# Patient Record
Sex: FEMALE | Race: WHITE | NOT HISPANIC OR LATINO | Employment: FULL TIME | URBAN - METROPOLITAN AREA
[De-identification: names, ages, dates, MRNs, and addresses within clinical notes are randomized per-mention and may not be internally consistent; named-entity substitution may affect disease eponyms.]

---

## 2021-01-19 ENCOUNTER — APPOINTMENT (OUTPATIENT)
Dept: GENERAL RADIOLOGY | Facility: CLINIC | Age: 31
End: 2021-01-19
Attending: EMERGENCY MEDICINE
Payer: COMMERCIAL

## 2021-01-19 ENCOUNTER — HOSPITAL ENCOUNTER (EMERGENCY)
Facility: CLINIC | Age: 31
Discharge: HOME | End: 2021-01-20
Attending: EMERGENCY MEDICINE | Admitting: EMERGENCY MEDICINE
Payer: COMMERCIAL

## 2021-01-19 DIAGNOSIS — R50.9 FEVER IN ADULT: ICD-10-CM

## 2021-01-19 DIAGNOSIS — R07.9 CHEST PAIN, UNSPECIFIED TYPE: ICD-10-CM

## 2021-01-19 DIAGNOSIS — U07.1 INFECTION DUE TO 2019 NOVEL CORONAVIRUS: ICD-10-CM

## 2021-01-19 LAB
ALBUMIN SERPL-MCNC: 3.4 G/DL (ref 3.4–5)
ALP SERPL-CCNC: 82 U/L (ref 40–150)
ALT SERPL W P-5'-P-CCNC: 39 U/L (ref 0–50)
ANION GAP SERPL CALCULATED.3IONS-SCNC: 4 MMOL/L (ref 3–14)
AST SERPL W P-5'-P-CCNC: 11 U/L (ref 0–45)
B-HCG FREE SERPL-ACNC: <5 IU/L
BASOPHILS # BLD AUTO: 0.1 10E9/L (ref 0–0.2)
BASOPHILS NFR BLD AUTO: 0.4 %
BILIRUB SERPL-MCNC: 0.4 MG/DL (ref 0.2–1.3)
BUN SERPL-MCNC: 16 MG/DL (ref 7–30)
CALCIUM SERPL-MCNC: 8.7 MG/DL (ref 8.5–10.1)
CHLORIDE SERPL-SCNC: 108 MMOL/L (ref 94–109)
CO2 SERPL-SCNC: 24 MMOL/L (ref 20–32)
CREAT SERPL-MCNC: 0.68 MG/DL (ref 0.52–1.04)
D DIMER PPP FEU-MCNC: 0.4 UG/ML FEU (ref 0–0.5)
DIFFERENTIAL METHOD BLD: ABNORMAL
EOSINOPHIL # BLD AUTO: 0.3 10E9/L (ref 0–0.7)
EOSINOPHIL NFR BLD AUTO: 2.8 %
ERYTHROCYTE [DISTWIDTH] IN BLOOD BY AUTOMATED COUNT: 12.2 % (ref 10–15)
GFR SERPL CREATININE-BSD FRML MDRD: >90 ML/MIN/{1.73_M2}
GLUCOSE SERPL-MCNC: 104 MG/DL (ref 70–99)
HCT VFR BLD AUTO: 34.4 % (ref 35–47)
HGB BLD-MCNC: 11.4 G/DL (ref 11.7–15.7)
IMM GRANULOCYTES # BLD: 0.1 10E9/L (ref 0–0.4)
IMM GRANULOCYTES NFR BLD: 0.8 %
LIPASE SERPL-CCNC: 187 U/L (ref 73–393)
LYMPHOCYTES # BLD AUTO: 1.6 10E9/L (ref 0.8–5.3)
LYMPHOCYTES NFR BLD AUTO: 13.3 %
MCH RBC QN AUTO: 30.6 PG (ref 26.5–33)
MCHC RBC AUTO-ENTMCNC: 33.1 G/DL (ref 31.5–36.5)
MCV RBC AUTO: 92 FL (ref 78–100)
MONOCYTES # BLD AUTO: 0.6 10E9/L (ref 0–1.3)
MONOCYTES NFR BLD AUTO: 5.1 %
NEUTROPHILS # BLD AUTO: 9.3 10E9/L (ref 1.6–8.3)
NEUTROPHILS NFR BLD AUTO: 77.6 %
NRBC # BLD AUTO: 0 10*3/UL
NRBC BLD AUTO-RTO: 0 /100
PLATELET # BLD AUTO: 342 10E9/L (ref 150–450)
POTASSIUM SERPL-SCNC: 4 MMOL/L (ref 3.4–5.3)
PROT SERPL-MCNC: 7.5 G/DL (ref 6.8–8.8)
RBC # BLD AUTO: 3.73 10E12/L (ref 3.8–5.2)
SODIUM SERPL-SCNC: 136 MMOL/L (ref 133–144)
TROPONIN I SERPL-MCNC: <0.015 UG/L (ref 0–0.04)
WBC # BLD AUTO: 12 10E9/L (ref 4–11)

## 2021-01-19 PROCEDURE — 96374 THER/PROPH/DIAG INJ IV PUSH: CPT

## 2021-01-19 PROCEDURE — 84484 ASSAY OF TROPONIN QUANT: CPT | Performed by: EMERGENCY MEDICINE

## 2021-01-19 PROCEDURE — 87636 SARSCOV2 & INF A&B AMP PRB: CPT | Performed by: EMERGENCY MEDICINE

## 2021-01-19 PROCEDURE — 71045 X-RAY EXAM CHEST 1 VIEW: CPT

## 2021-01-19 PROCEDURE — 85025 COMPLETE CBC W/AUTO DIFF WBC: CPT | Performed by: EMERGENCY MEDICINE

## 2021-01-19 PROCEDURE — 84702 CHORIONIC GONADOTROPIN TEST: CPT

## 2021-01-19 PROCEDURE — 80053 COMPREHEN METABOLIC PANEL: CPT | Performed by: EMERGENCY MEDICINE

## 2021-01-19 PROCEDURE — C9803 HOPD COVID-19 SPEC COLLECT: HCPCS

## 2021-01-19 PROCEDURE — 258N000003 HC RX IP 258 OP 636: Performed by: EMERGENCY MEDICINE

## 2021-01-19 PROCEDURE — 93005 ELECTROCARDIOGRAM TRACING: CPT

## 2021-01-19 PROCEDURE — 83690 ASSAY OF LIPASE: CPT | Performed by: EMERGENCY MEDICINE

## 2021-01-19 PROCEDURE — 250N000013 HC RX MED GY IP 250 OP 250 PS 637: Performed by: EMERGENCY MEDICINE

## 2021-01-19 PROCEDURE — 96361 HYDRATE IV INFUSION ADD-ON: CPT

## 2021-01-19 PROCEDURE — 85379 FIBRIN DEGRADATION QUANT: CPT | Performed by: EMERGENCY MEDICINE

## 2021-01-19 PROCEDURE — 250N000009 HC RX 250: Performed by: EMERGENCY MEDICINE

## 2021-01-19 PROCEDURE — 99285 EMERGENCY DEPT VISIT HI MDM: CPT | Mod: 25

## 2021-01-19 RX ORDER — LIDOCAINE 40 MG/G
CREAM TOPICAL
Status: DISCONTINUED | OUTPATIENT
Start: 2021-01-19 | End: 2021-01-20 | Stop reason: HOSPADM

## 2021-01-19 RX ORDER — IBUPROFEN 200 MG
600 TABLET ORAL EVERY 8 HOURS PRN
Qty: 1 TABLET | Refills: 0 | Status: SHIPPED | OUTPATIENT
Start: 2021-01-19

## 2021-01-19 RX ORDER — IBUPROFEN 600 MG/1
600 TABLET, FILM COATED ORAL ONCE
Status: COMPLETED | OUTPATIENT
Start: 2021-01-19 | End: 2021-01-19

## 2021-01-19 RX ORDER — SODIUM CHLORIDE 9 MG/ML
INJECTION, SOLUTION INTRAVENOUS CONTINUOUS
Status: DISCONTINUED | OUTPATIENT
Start: 2021-01-19 | End: 2021-01-20 | Stop reason: HOSPADM

## 2021-01-19 RX ADMIN — IBUPROFEN 600 MG: 600 TABLET, FILM COATED ORAL at 23:24

## 2021-01-19 RX ADMIN — SODIUM CHLORIDE 500 ML: 9 INJECTION, SOLUTION INTRAVENOUS at 21:07

## 2021-01-19 RX ADMIN — FAMOTIDINE 20 MG: 10 INJECTION, SOLUTION INTRAVENOUS at 21:13

## 2021-01-19 ASSESSMENT — MIFFLIN-ST. JEOR: SCORE: 1173.88

## 2021-01-19 ASSESSMENT — ENCOUNTER SYMPTOMS
VOMITING: 1
FEVER: 1
COUGH: 1

## 2021-01-20 ENCOUNTER — TELEPHONE (OUTPATIENT)
Dept: EMERGENCY MEDICINE | Facility: CLINIC | Age: 31
End: 2021-01-20

## 2021-01-20 VITALS
HEART RATE: 105 BPM | RESPIRATION RATE: 27 BRPM | SYSTOLIC BLOOD PRESSURE: 129 MMHG | WEIGHT: 108 LBS | OXYGEN SATURATION: 100 % | BODY MASS INDEX: 19.14 KG/M2 | HEIGHT: 63 IN | DIASTOLIC BLOOD PRESSURE: 84 MMHG | TEMPERATURE: 101 F

## 2021-01-20 LAB
FLUAV RNA RESP QL NAA+PROBE: NEGATIVE
FLUBV RNA RESP QL NAA+PROBE: NEGATIVE
INTERPRETATION ECG - MUSE: NORMAL
LABORATORY COMMENT REPORT: ABNORMAL
RSV RNA SPEC QL NAA+PROBE: ABNORMAL
SARS-COV-2 RNA RESP QL NAA+PROBE: POSITIVE
SPECIMEN SOURCE: ABNORMAL

## 2021-01-20 ASSESSMENT — ENCOUNTER SYMPTOMS
HEADACHES: 0
ABDOMINAL PAIN: 0
DYSURIA: 0

## 2021-01-20 NOTE — ED TRIAGE NOTES
Dx with COVID 4 weeks ago. Got better for a while but recent return of SOB, fever, cough, and chest pain last night. Was given prednisone pack a week ago and cough medicine.

## 2021-01-20 NOTE — TELEPHONE ENCOUNTER
Coronavirus (COVID-19) Notification    Reason for call  Notify of POSITIVE  COVID-19 lab result, assess symptoms,  review [facility] recommendations    Lab Result   Lab test for 2019-nCoV rRt-PCR or SARS-COV-2 PCR  Oropharyngeal AND/OR nasopharyngeal swabs were POSITIVE for 2019-nCoV RNA [OR] SARS-COV-2 RNA (COVID-19) RNA     We have been unable to reach Patient by phone at this time to notify of their Positive COVID-19 result.  Left voicemail message requesting a call back to [phone] [facility] for results.        POSITIVE COVID-19 Letter sent.    Jessica Fang LPN

## 2021-01-20 NOTE — ED PROVIDER NOTES
History   Chief Complaint:  Cough and Fever    HPI  Coral Green is a 30 year old female who presents to the ED for evaluation of cough, fever, and chest pain. The patient reports she had COVID 4 weeks ago, with the symptoms going away for the most part. However, her cough came back last night, which also brought new chest pain. She also found herself to be febrile today with a temperature of 102.7. She states the chest pain is exacerbated when she coughs and when she is changing positions. She also says it hurts when she is trying to have a bowel movement. She took 1 ibuprofen this evening to help. She reports and episode of emesis after being unable to keep food/liquid down. Her last menstrual period was on Jan 2nd and she is currently on birth control. She denies a family history of blood clots. Further, she denies any leg swelling or leg pain.    Allergies:  The patient has no known allergies.     Medications:    The patient is not currently taking any prescribed medications.    Past Medical History:    COVID-19      Past Surgical History:    The patient denies past surgical history.      Family History:    The patient denies past family history.     Social History:  PCP: [facility]  Presents to the ED with self    Review of Systems   Constitutional: Positive for fever.   Respiratory: Positive for cough.    Cardiovascular: Positive for chest pain. Negative for leg swelling.   Gastrointestinal: Positive for vomiting. Negative for abdominal pain.   Genitourinary: Negative for dysuria.   Neurological: Negative for headaches.   All other systems reviewed and are negative.      Physical Exam     Patient Vitals for the past 24 hrs:   BP Temp Temp src Pulse Resp SpO2 Height Weight   01/19/21 2140 -- -- -- 98 29 100 % -- --   01/19/21 2139 -- -- -- 95 11 100 % -- --   01/19/21 2138 -- -- -- 98 16 100 % -- --   01/19/21 2137 -- -- -- 98 18 100 % -- --   01/19/21 2136 -- -- -- 101 22 99 %  "-- --   01/19/21 2118 -- -- -- -- -- 99 % -- --   01/19/21 2117 -- -- -- -- -- 98 % -- --   01/19/21 2020 -- -- -- -- -- 96 % -- --   01/19/21 2019 129/84 99.5  F (37.5  C) Oral -- -- -- 1.6 m (5' 2.99\") 49 kg (108 lb)   01/19/21 2017 129/84 -- -- 113 22 -- -- --       Physical Exam  HENT:    Mouth/Throat: Oropharynx is without swelling or erythema. Oral mucosa moist.    Eyes: Conjunctivae are normal. No scleral icterus.  Neck: Neck supple. No cervical adenopathy  Cardiovascular: Tachycardic rate, regular rhythm and intact distal pulses. NO chest wall tenderness or overlying skin changes.     Pulmonary/Chest: Effort normal and breath sounds normal.   Abdominal: Soft.  No distension. There is no tenderness.   Musculoskeletal:  No edema, No calf tenderness  Neurological:Alert and answering questions appropriately. Coordination normal.   Skin: Skin is warm and dry.   Psychiatric: Normal mood and affect.     Emergency Department Course   ECG (21:17:04):  Rate 102 bpm. KY interval 132. QRS duration 84. QT/QTc 338/440. P-R-T axes 61 75 55. Sinus tachycardia. Otherwise normal ECG. Interpreted at 2132 by Chelsi Deutsch MD.    Imaging  XR Chest Port 1 View  IMPRESSION: Cardiomediastinal silhouette is within normal limits. No focal consolidation or pleural effusion.  As read by Radiology.     Laboratory:  Laboratory findings were communicated with the patient who voiced understanding of the findings.    CBC: WBC: 12.0 (H), HGB: 11.4 (L), PLT: 342    CMP: Glc 104 (H) o/w WNL (Creatinine 0.68)    D Dimer: 0.4    Lipase: 187     HCG Quantitative: <5.0    Troponin (Collected 2106): <0.015    Symptomatic Influenza A/B antigen & COVID-19 PCR: pending     Emergency Department Course:    Reviewed:  I reviewed the patient's nursing notes, vitals.    Assessments:  2046 I first assessed the patient and performed an exam. Discussed plans for care.    2345 I reassessed the patient and updated her on her results. She was feeling " chilled again. Repeat temp was 102.1.     Interventions:  2107: NS 1000 mL IV Bolus   2113: Pepcid 20 mg IV    Medications   famotidine (PEPCID) injection 20 mg (20 mg Intravenous Given 1/19/21 2113)   0.9% sodium chloride BOLUS (0 mLs Intravenous Stopped 1/19/21 2306)   ibuprofen (ADVIL/MOTRIN) tablet 600 mg (600 mg Oral Given 1/19/21 2324)       Disposition:  The patient was discharged to home.       Impression & Plan    Medical Decision Making:   Coral Green is a 30 year old female who presents with fever, cough and chest pain as described above. The differential diagnosis included but was not limited to PE, pneumonia, COVID, influenza. ED evaluation as noted above. She was noted to have a mildly elevated WBC but otherwise negative labs. Chest x ray was negative for pneumonia. There is no other source for bacterial infection based on history and physical. Patient feels comfortably with discharge home pending COVID and influenza. Reports improvement in chest pain with ibuprofen. With reasonable clinical certainty I feel that the patient is safe for discharge home for ongoing evaluation and management as an outpatient. She will follow up with PMD in 2 days if not improving otherwise return tot he ED with new/worsening symptoms.     Addendum: After discharge COVID returned positive. Charge RN called with update.       Diagnosis:     ICD-10-CM    1. Fever in adult  R50.9 Symptomatic Influenza A/B & SARS-CoV2 (COVID-19) Virus PCR Multiplex   2. Chest pain, unspecified type  R07.9    3. Infection due to 2019 novel coronavirus  U07.1        Disposition:   Discharged to home.    Discharge Medications:  New Prescriptions    No medications on file        Scribe Disclosure:  BRITTNEY, Jenna Bolden, am serving as a scribe on 1/19/2021 at 8:46 PM to personally document services performed by Chelsi Deutsch MD, based on my observations and the provider's statements to me.           Chelsi Deutsch,  MD  01/20/21 0240

## 2021-01-20 NOTE — DISCHARGE INSTRUCTIONS
Discharge Instructions  COVID-19    COVID-19 is the disease caused by a new coronavirus. The virus spreads from person-to-person primarily by droplets when an infected person coughs or sneezes and the droplet either lands on another person or that other person touches a surface with the droplet on it. There are tests available to diagnose COVID-19. There is no specific treatment or medicine for the disease.    You may have been diagnosed with COVID, may be being tested for COVID and have a pending test result, or may have been exposed to COVID.    Symptoms of COVID-19    Many people have no symptoms or mild symptoms.  Symptoms may usually appear 4 to 5 days (up to 14 days) after contact with a person with COVID-19. Some people will get severe symptoms and pneumonia. Usual symptoms are:     ? Fever  ? Cough  ? Trouble breathing    Less common symptoms are: Headache, body aches, sore throat, sneezing, diarrhea, loss of taste or smell.    Isolation and Quarantine    You were seen because you have symptoms, had an exposure, or had some other concern about possible COVID. The best way to stop the spread of the virus is to avoid contact with others.  Isolation refers to sick people staying away from people who are not sick. A person in quarantine is limiting activity because they were exposed and are waiting to see if they might become sick.    If you test positive for COVID, you should stay home (isolation) for at least 10 days after your symptoms began, and for 24 hours with no fever and improvement of symptoms--whichever is longer. (Your fever should be gone for 24 hours without using fever-reducing medicine). If you have no symptoms, you should stay home (isolation) for 10 days from the day of the test.    For example, if you have a fever and cough for 6 days, you need to stay home 4 more days with no fever for a total of 10 days. Or, if you have a fever and cough for 10 days, you need to stay home one more day with  no fever for a total of 11 days.    If you have a high-risk exposure to COVID (you spent 15 minutes or more within six feet of somebody who has COVID), you should stay home (quarantine) for 14 days. Even if you test negative for COVID, the CDC recommends a 14-day quarantine from the time of your last exposure to that individual. There are options for a shortened (<14 day quarantine) you can review at:    https://www.health.[facility]./diseases/coronavirus/close.html#long    If you have symptoms but a negative test, you should stay at home until you are symptom-free and without fever for 24 hours, using the same judgment you would for when it is safe to return to work/school from strep throat, influenza, or the common cold. If you worsen, you should consider being re-evaluated.    If you are being tested for COVID and your test is pending, you should stay home until you know your test result.    How should I protect myself and others?    Do not go to work or school. Have a friend or relative do your shopping. Do not use public transportation (bus, train) or ridesharing (Lyft, Uber).    Separate yourself from other people in your home. As much as possible, you should stay in one room and away from other people in your home. Also, use a separate bathroom, if possible. Avoid handling pets or other animals while sick.     Wear a facemask if you need to be around other people and cover your mouth and nose with a tissue when you cough or sneeze.     Avoid sharing personal household items. You should not share dishes, drinking glasses, forks/knives/spoons, towels, or bedding with other people in your home. After using these items, they should be washed with soap and water. Clean parts of your home that are touched often (doorknobs, faucets, countertops, etc.) daily.     Wash your hands often with soap and water for at least 20 seconds or use an alcohol-based hand  containing at least 60% alcohol.     Avoid touching  your face.    Treat your symptoms. You can take Acetaminophen (Tylenol) to treat body aches and fever as needed for comfort. Ibuprofen (Advil or Motrin) can be used as well if you still have symptoms after taking Tylenol. Drink fluids. Rest.    Watch for worsening symptoms such as shortness of breath/difficulty breathing or very severe weakness.    Employers/workplaces are being asked by the Centers for Disease Control (CDC) to not request notes/documentation for you to return to work or prove that you were ill. You may choose to show your employer this paperwork. Also, repeat testing should not be required to return to work.    Return to the Emergency Department if:    If you are developing worsening breathing, shortness of breath, or feel worse you should seek medical attention.  If you are uncertain, contact your health care provider/clinic. If you need emergency medical attention, call 911 and tell them you have been ill.   Discharge Instructions  Fever    You have been seen today for a fever. Fever is a normal body reaction to illness or inflammation. Fever is a sign that your body is doing what it should to fight something off. Fever is not dangerous, but it can make you feel miserable, and you will probably feel better if you get your fever to go down. Most infections are caused by a virus, and antibiotics will not help; your provider will tell you whether antibiotics are needed in your case. At this time your provider does not find that your fever is a sign of anything dangerous or life-threatening.  However, sometimes the signs of serious illness do not show up right away so additional care may be necessary.    Generally, every Emergency Department visit should have a follow-up clinic visit with either a primary or a specialty clinic/provider. Please follow-up as instructed by your emergency provider today.    What can I do to help myself?  Fill any prescriptions the provider gave you and take them right  away--especially antibiotics.  If you have a fever, get plenty of rest and drink lots of fluids, especially water.  What clothes or blankets you have on will not change your fever. Do what is comfortable for you.  Bathing or sponging in lukewarm water may help you feel better.  Tylenol  (acetaminophen), Motrin  (ibuprofen), or Advil  (ibuprofen) help bring fever down and may help you feel more comfortable. Be sure to read and follow the package directions, and ask your provider if you have questions.  Do not drink alcohol.    Return to the Emergency Department if:  Any of the symptoms you have get much worse.  You seem very sick, like being too weak to get up.  You have any new symptoms, especially serious things like abdominal (belly) pain or chest pain.  You are short of breath.  You have a severe headache.  You are vomiting (throwing up) so much you cannot keep fluids or medicines down.  You have confusion or seem unusually drowsy.  You have a seizure.  You have anything else that worries you.  If you were given a prescription for medicine here today, be sure to read all of the information (including the package insert) that comes with your prescription.  This will include important information about the medicine, its side effects, and any warnings that you need to know about.  The pharmacist who fills the prescription can provide more information and answer questions you may have about the medicine.  If you have questions or concerns that the pharmacist cannot address, please call or return to the Emergency Department.   Remember that you can always come back to the Emergency Department if you are not able to see your regular provider in the amount of time listed above, if you get any new symptoms, or if there is anything that worries you.

## 2021-01-20 NOTE — TELEPHONE ENCOUNTER
"Coronavirus (COVID-19) Notification    Caller Name (Patient, parent, daughter/son, grandparent, etc)  Coral Mohammadpour    Reason for call  Notify of Positive Coronavirus (COVID-19) lab results, assess symptoms,  review  [facility] recommendations    Lab Result    Lab test:  2019-nCoV rRt-PCR or SARS-CoV-2 PCR    Oropharyngeal AND/OR nasopharyngeal swabs is POSITIVE for 2019-nCoV RNA/SARS-COV-2 PCR (COVID-19 virus)    RN Recommendations/Instructions per [facility] Coronavirus COVID-19 recommendations    Brief introduction script  Introduce self then review script:  \"I am calling on behalf of [employer].  We were notified that your Coronavirus test (COVID-19) for was POSITIVE for the virus.  I have some information to relay to you but first I wanted to mention that the [place] Dept of Health will be contacting you shortly [it's possible MD already called Patient] to talk to you more about how you are feeling and other people you have had contact with who might now also have the virus.  Also,  [facility] is Partnering with the [facility] for Covid-19 research, you may be contacted directly by research staff.\"    Assessment (Inquire about Patient's current symptoms)   Assessment   Current Symptoms at time of phone call: (if no symptoms, document No symptoms] Fevers, cough, body aches   Symptoms onset (if applicable) 1/18/21     If at time of call, Patients symptoms hare worsened, the Patient should contact 911 or have someone drive them to Emergency Dept promptly:      If Patient calling 911, inform 911 personal that you have tested positive for the Coronavirus (COVID-19).  Place mask on and await 911 to arrive.    If Emergency Dept, If possible, please have another adult drive you to the Emergency Dept but you need to wear mask when in contact with other people.      Monoclonal Antibody Administration    You may be eligible to receive a new treatment with a monoclonal antibody for " "preventing hospitalization in patients at high risk for complications from COVID-19.   This medication is still experimental and available on a limited basis; it is given through an IV and must be given at an infusion center. Please note that not all people who are eligible will receive the medication since it is in limited supply.     Are you interested in being considered for this medication?  No.   Does the patient fit the criteria: Patient declined    If patient qualifies based on above criteria:  \"We will contact you if you are selected to receive the medication in the next 1-2 days.   This is time sensitive and if you are not selected in the next 1-2 days, you will not receive the medication.  If you do not receive a call to schedule, you have not been selected.\"    Review information with Patient    Your result was positive. This means you have COVID-19 (coronavirus).  We have sent you a letter that reviews the information that I'll be reviewing with you now.    How can I protect others?    If you have symptoms: stay home and away from others (self-isolate) until:    You've had no fever--and no medicine that reduces fever--for 1 full day (24 hours). And       Your other symptoms have gotten better. For example, your cough or breathing has improved. And     At least 10 days have passed since your symptoms started. (If you've been told by a doctor that you have a weak immune system, wait 20 days.)     If you don't have symptoms: Stay home and away from others (self-isolate) until at least 10 days have passed since your first positive COVID-19 test. (Date test collected)    During this time:    Stay in your own room, including for meals. Use your own bathroom if you can.    Stay away from others in your home. No hugging, kissing or shaking hands. No visitors.     Don't go to work, school or anywhere else.     Clean  high touch  surfaces often (doorknobs, counters, handles, etc.). Use a household cleaning spray " or wipes. You'll find a full list on the EPA website at www.epa.gov/pesticide-registration/list-n-disinfectants-use-against-sars-cov-2.     Cover your mouth and nose with a mask, tissue or other face covering to avoid spreading germs.    Wash your hands and face often with soap and water.    Caregivers in these groups are at risk for severe illness due to COVID-19:  o People 65 years and older  o People who live in a nursing home or long-term care facility  o People with chronic disease (lung, heart, cancer, diabetes, kidney, liver, immunologic)  o People who have a weakened immune system, including those who:  - Are in cancer treatment  - Take medicine that weakens the immune system, such as corticosteroids  - Had a bone marrow or organ transplant  - Have an immune deficiency  - Have poorly controlled HIV or AIDS  - Are obese (body mass index of 40 or higher)  - Smoke regularly    Caregivers should wear gloves while washing dishes, handling laundry and cleaning bedrooms and bathrooms.    Wash and dry laundry with special caution. Don't shake dirty laundry, and use the warmest water setting you can.    If you have a weakened immune system, ask your doctor about other actions you should take.    For more tips, go to www.cdc.gov/coronavirus/2019-ncov/downloads/10Things.pdf.    You should not go back to work until you meet the guidelines above for ending your home isolation. You don't need to be retested for COVID-19 before going back to work--studies show that you won't spread the virus if it's been at least 10 days since your symptoms started (or 20 days, if you have a weak immune system).    Employers: This document serves as formal notice of your employee's medical guidelines for going back to work. They must meet the above guidelines before going back to work in person.    How can I take care of myself?    1. Get lots of rest. Drink extra fluids (unless a doctor has told you not to).    2. Take Tylenol  (acetaminophen) for fever or pain. If you have liver or kidney problems, ask your family doctor if it's okay to take Tylenol.     Take either:     650 mg (two 325 mg pills) every 4 to 6 hours, or     1,000 mg (two 500 mg pills) every 8 hours as needed.     Note: Don't take more than 3,000 mg in one day. Acetaminophen is found in many medicines (both prescribed and over-the-counter medicines). Read all labels to be sure you don't take too much.    For children, check the Tylenol bottle for the right dose (based on their age or weight).    3. If you have other health problems (like cancer, heart failure, an organ transplant or severe kidney disease): Call your specialty clinic if you don't feel better in the next 2 days.    4. Know when to call 911: Emergency warning signs include:    Trouble breathing or shortness of breath    Pain or pressure in the chest that doesn't go away    Feeling confused like you haven't felt before, or not being able to wake up    Bluish-colored lips or face    5. Sign up for [employer]. We know it's scary to hear that you have COVID-19. We want to track your symptoms to make sure you're okay over the next 2 weeks. Please look for an email from [employer]--this is a free, online program that we'll use to keep in touch. To sign up, follow the link in the email. Learn more at [email or web address].    Where can I get more information?    [facility]: [email or web address]    Coronavirus Basics: www.health.[facility].[place]/diseases/coronavirus/basics.html    What to Do If You're Sick: www.cdc.gov/coronavirus/2019-ncov/about/steps-when-sick.html    Ending Home Isolation: www.cdc.gov/coronavirus/2019-ncov/hcp/disposition-in-home-patients.html     Caring for Someone with COVID-19: www.cdc.gov/coronavirus/2019-ncov/if-you-are-sick/care-for-someone.html     [facility] clinical trials (COVID-19 research studies): [email or web address].[facility]-clinical-trials     A  Positive COVID-19 letter will be sent via [employer] or the mail. (Exception, no letters sent to Presurgerical/Preprocedure Patients)    Jessica Fang LPN

## 2021-01-21 ENCOUNTER — NURSE TRIAGE (OUTPATIENT)
Dept: NURSING | Facility: CLINIC | Age: 31
End: 2021-01-21

## 2021-01-22 NOTE — TELEPHONE ENCOUNTER
Patient's  calling w/ patient who was seen in ED on Tuesday for fever and cough.  He says patient's chills is gone and her oxygen level is good.  Patient is coughing a lot and he wants to know what he can do for her. Patient also as diarrhea and did vomit once today and yesterday. Patient was tested positive on 12/31/20 and test on 1-19-21 was also positive. Patient has chest pain when she is coughing a lot otherwise no chest pain. Patient does not have shortness of breath.  Patient has not follow-up with [facility] yet. Patient is on ibuprofen ever 6-8 hrs and is taking dextromethorphan and honey for the cough.    Reviewed care advice with caller per RN triage protocol guideline to schedule appointment with T[facility] tomorrow.  Caller verbalized understanding and agrees with plan.            Reason for Disposition    [1] Continuous (nonstop) coughing interferes with work or school AND [2] no improvement using cough treatment per protocol    Additional Information    Negative: SEVERE difficulty breathing (e.g., struggling for each breath, speaks in single words)    Negative: Difficult to awaken or acting confused (e.g., disoriented, slurred speech)    Negative: Bluish (or gray) lips or face now    Negative: Shock suspected (e.g., cold/pale/clammy skin, too weak to stand, low BP, rapid pulse)    Negative: Sounds like a life-threatening emergency to the triager    Negative: [1] COVID-19 exposure AND [2] no symptoms    Negative: [1] Lives with someone known to have influenza (flu test positive) AND [2] flu-like symptoms (e.g., cough, runny nose, sore throat, SOB; with or without fever)    Negative: [1] Adult with possible COVID-19 symptoms AND [2] triager concerned about severity of symptoms or other causes    Negative: COVID-19 vaccine reaction suspected (e.g., fever, headache, muscle aches) occurring during days 1-3 after getting vaccine    Negative: COVID-19 vaccine, questions about    Negative: COVID-19 and  breastfeeding, questions about    Negative: SEVERE or constant chest pain or pressure (Exception: mild central chest pain, present only when coughing)    Negative: MODERATE difficulty breathing (e.g., speaks in phrases, SOB even at rest, pulse 100-120)    Negative: [1] Headache AND [2] stiff neck (can't touch chin to chest)    Negative: MILD difficulty breathing (e.g., minimal/no SOB at rest, SOB with walking, pulse <100)    Negative: Chest pain or pressure    Negative: Patient sounds very sick or weak to the triager    Negative: Fever > 103 F (39.4 C)    Negative: [1] Fever > 101 F (38.3 C) AND [2] age > 60    Negative: [1] Fever > 100.0 F (37.8 C) AND [2] bedridden (e.g., nursing home patient, CVA, chronic illness, recovering from surgery)    Negative: [1] HIGH RISK patient (e.g., age > 64 years, diabetes, heart or lung disease, weak immune system) AND [2] new or worsening symptoms    Negative: [1] HIGH RISK patient AND [2] influenza is widespread in the community AND [3] ONE OR MORE respiratory symptoms: cough, sore throat, runny or stuffy nose    Negative: [1] HIGH RISK patient AND [2] influenza exposure within the last 7 days AND [3] ONE OR MORE respiratory symptoms: cough, sore throat, runny or stuffy nose    Negative: Fever present > 3 days (72 hours)    Protocols used: CORONAVIRUS (COVID-19) DIAGNOSED OR [id number] 1.3

## 2021-01-23 ENCOUNTER — HOSPITAL ENCOUNTER (EMERGENCY)
Facility: CLINIC | Age: 31
Discharge: HOME | End: 2021-01-23
Attending: EMERGENCY MEDICINE | Admitting: EMERGENCY MEDICINE
Payer: COMMERCIAL

## 2021-01-23 VITALS
DIASTOLIC BLOOD PRESSURE: 81 MMHG | WEIGHT: 104 LBS | BODY MASS INDEX: 18.43 KG/M2 | HEIGHT: 63 IN | OXYGEN SATURATION: 99 % | TEMPERATURE: 99.3 F | HEART RATE: 90 BPM | RESPIRATION RATE: 20 BRPM | SYSTOLIC BLOOD PRESSURE: 115 MMHG

## 2021-01-23 DIAGNOSIS — R19.7 DIARRHEA, UNSPECIFIED TYPE: ICD-10-CM

## 2021-01-23 DIAGNOSIS — U07.1 INFECTION DUE TO 2019 NOVEL CORONAVIRUS: ICD-10-CM

## 2021-01-23 DIAGNOSIS — E87.6 HYPOKALEMIA: ICD-10-CM

## 2021-01-23 LAB
ANION GAP SERPL CALCULATED.3IONS-SCNC: 5 MMOL/L (ref 3–14)
BUN SERPL-MCNC: 6 MG/DL (ref 7–30)
CALCIUM SERPL-MCNC: 8.3 MG/DL (ref 8.5–10.1)
CHLORIDE SERPL-SCNC: 105 MMOL/L (ref 94–109)
CO2 SERPL-SCNC: 23 MMOL/L (ref 20–32)
CREAT SERPL-MCNC: 0.59 MG/DL (ref 0.52–1.04)
GFR SERPL CREATININE-BSD FRML MDRD: >90 ML/MIN/{1.73_M2}
GLUCOSE SERPL-MCNC: 73 MG/DL (ref 70–99)
POTASSIUM SERPL-SCNC: 3.3 MMOL/L (ref 3.4–5.3)
POTASSIUM SERPL-SCNC: 4 MMOL/L (ref 3.4–5.3)
SODIUM SERPL-SCNC: 133 MMOL/L (ref 133–144)

## 2021-01-23 PROCEDURE — 250N000013 HC RX MED GY IP 250 OP 250 PS 637: Performed by: EMERGENCY MEDICINE

## 2021-01-23 PROCEDURE — 80048 BASIC METABOLIC PNL TOTAL CA: CPT | Performed by: EMERGENCY MEDICINE

## 2021-01-23 PROCEDURE — 99283 EMERGENCY DEPT VISIT LOW MDM: CPT | Mod: 25

## 2021-01-23 PROCEDURE — 258N000003 HC RX IP 258 OP 636: Performed by: EMERGENCY MEDICINE

## 2021-01-23 PROCEDURE — 84132 ASSAY OF SERUM POTASSIUM: CPT | Performed by: EMERGENCY MEDICINE

## 2021-01-23 PROCEDURE — 96360 HYDRATION IV INFUSION INIT: CPT

## 2021-01-23 RX ORDER — BENZONATATE 100 MG/1
100 CAPSULE ORAL 3 TIMES DAILY PRN
Qty: 20 CAPSULE | Refills: 0 | Status: SHIPPED | OUTPATIENT
Start: 2021-01-23

## 2021-01-23 RX ORDER — POTASSIUM CHLORIDE 1500 MG/1
40 TABLET, EXTENDED RELEASE ORAL ONCE
Status: COMPLETED | OUTPATIENT
Start: 2021-01-23 | End: 2021-01-23

## 2021-01-23 RX ORDER — ONDANSETRON 4 MG/1
4 TABLET, ORALLY DISINTEGRATING ORAL EVERY 8 HOURS PRN
Qty: 10 TABLET | Refills: 0 | Status: SHIPPED | OUTPATIENT
Start: 2021-01-23 | End: 2021-01-26

## 2021-01-23 RX ORDER — SODIUM CHLORIDE 9 MG/ML
INJECTION, SOLUTION INTRAVENOUS CONTINUOUS
Status: DISCONTINUED | OUTPATIENT
Start: 2021-01-23 | End: 2021-01-23 | Stop reason: HOSPADM

## 2021-01-23 RX ORDER — POTASSIUM CHLORIDE 1500 MG/1
20 TABLET, EXTENDED RELEASE ORAL DAILY
Qty: 5 TABLET | Refills: 0 | Status: SHIPPED | OUTPATIENT
Start: 2021-01-23 | End: 2021-01-28

## 2021-01-23 RX ADMIN — SODIUM CHLORIDE 1000 ML: 9 INJECTION, SOLUTION INTRAVENOUS at 16:21

## 2021-01-23 RX ADMIN — POTASSIUM CHLORIDE 40 MEQ: 1500 TABLET, EXTENDED RELEASE ORAL at 18:01

## 2021-01-23 ASSESSMENT — ENCOUNTER SYMPTOMS
COUGH: 1
FREQUENCY: 0
DIZZINESS: 0
HEMATURIA: 0
APPETITE CHANGE: 1
POLYDIPSIA: 1
DIARRHEA: 1
DYSURIA: 0
SHORTNESS OF BREATH: 0

## 2021-01-23 ASSESSMENT — MIFFLIN-ST. JEOR: SCORE: 1155.87

## 2021-01-23 NOTE — DISCHARGE INSTRUCTIONS
Discharge Instructions  Adult Diarrhea    You have been seen today for diarrhea (loose stools). This is usually caused by a virus, but some bacteria, parasites, medicines, or other medical conditions can cause similar symptoms. At this time your provider does not find that your diarrhea is a sign of anything dangerous or life-threatening. However, sometimes the signs of serious illness do not show up right away. If you have new or worse symptoms, you may need to be seen again in the Emergency Department or by your primary provider.     Generally, every Emergency Department visit should have a follow-up clinic visit with either a primary or a specialty clinic/provider. Please follow-up as instructed by your emergency provider today.    Return to the Emergency Department if:  You feel you are getting dehydrated, such as being very thirsty, not urinating (peeing) like usual, or feeling faint or lightheaded.   You develop a new fever.  You have abdominal (belly) pain that seems worse than cramps, is in one spot, or is getting worse over time.   You have blood in your stool or your stool becomes black.  (Remember that if you take Pepto-Bismol , this will turn your stool black).   You feel very weak.    What can I do to help myself?  The most important thing to do is to drink clear liquids.   It is best to have only small, frequent sips of liquids. Drinking too much at once may cause more diarrhea. You should also replace minerals, sodium and potassium lost with diarrhea. Pedialyte  and sports drinks can help you replace these minerals. You can also drink clear liquids such as water, weak tea, apple juice, and 7-Up . Avoid acidic liquids (orange juice), caffeine (coffee) or alcohol. Milk products will make the diarrhea worse.  Eat bland (plain) foods. Soda crackers, toast, plain noodles, gelatin, applesauce and bananas are good first choices. Avoid foods that have acid, are spicy, fatty or fibrous (such as meats, coarse  "grains, vegetables). You may start eating these foods again in about 3 days when you are better.   Sometimes treatment includes prescription medicine to prevent diarrhea. If your provider prescribes these for you, take them as directed.   Nonprescription medicine is available for the treatment of diarrhea and can be very effective. If you use it, make sure you use the dose recommended on the package. Check with your healthcare provider before you use any medicine for diarrhea.   Do not take ibuprofen, or other nonsteroidal anti-inflammatory medicines, without checking with your healthcare provider.   Probiotics: If you have been given an antibiotic, you may want to also take a probiotic pill or eat yogurt with live cultures. Probiotics have \"good bacteria\" to help your intestines stay healthy. Studies have shown that probiotics help prevent diarrhea and other intestine problems (including C. diff infection) when you take antibiotics. You can buy these without a prescription in the pharmacy section of the store.   If you were given a prescription for medicine here today, be sure to read all of the information (including the package insert) that comes with your prescription.  This will include important information about the medicine, its side effects, and any warnings that you need to know about.  The pharmacist who fills the prescription can provide more information and answer questions you may have about the medicine.  If you have questions or concerns that the pharmacist cannot address, please call or return to the Emergency Department.  Remember that you can always come back to the Emergency Department if you are not able to see your regular provider in the amount of time listed above, if you get any new symptoms, or if there is anything that worries you.   "

## 2021-01-23 NOTE — ED TRIAGE NOTES
Diagnosed with COVID about 1 month ago and doing ok until 3-4 days ago when fever, chest pain, cough, nausea/vomiting and watery diarrhea began again.    Seen here on Tuesday for the same symptoms.

## 2021-01-23 NOTE — ED PROVIDER NOTES
History     Chief Complaint:  Diarrhea       The history is provided by the patient.     Coral Green is a 30 year old female healthy at baseline who presents for evaluation of diarrhea in the setting of ongoing dry cough with decreased appetite and ongoing fevers with a  positive Covid test 1 month ago and repeat positive Covid test a few days ago here in our ED.  She notes she had improved after her initial Covid diagnosis and did not develop symptoms until 1 day prior to her visit here a few days ago.  She at that time had chest pain although that is resolved now.  She notes none of her symptoms are worsened except she has developed watery diarrhea 4-5 episodes per day and feels a discomfort every time she tries to eat or drink.  She reports excessive thirst.  She denies shortness of breath.  She denies change in urination.  She denies any recent hospitalization or antibiotics.  She denies dizziness or syncope.      XR Chest Port 1 View from 01/19/2021:  IMPRESSION: Cardiomediastinal silhouette is within normal limits. No focal consolidation or pleural effusion.  Reading per radiology.     Laboratory Results from 01/19/2021:  CBC: WBC: 12.0 (H), HGB: 11.4 (L), PLT: 342  CMP: Glc 104 (H) o/w WNL (Creatinine 0.68)  D Dimer: 0.4  Lipase: 187   HCG Quantitative: <5.0  Troponin (Collected 2106): <0.015  Symptomatic Influenza A/B antigen & COVID-19 PCR: COVID Positive (A), Influenza A/B Negative     Review of Systems   Constitutional: Positive for appetite change.   Respiratory: Positive for cough. Negative for shortness of breath.    Cardiovascular: Negative for chest pain.   Gastrointestinal: Positive for diarrhea.   Endocrine: Positive for polydipsia.   Genitourinary: Negative for dysuria, frequency, hematuria and urgency.   Neurological: Negative for dizziness and syncope.   All other systems reviewed and are negative.      Allergies:  No Known Drug Allergies      Medications:   The patient is not currently  "taking any prescribed medications.      Medical History:   COVID-19    Social History:  The patient was unaccompanied to the ED.  PCP: [facility]        Physical Exam     Patient Vitals for the past 24 hrs:   BP Temp Temp src Pulse Resp SpO2 Height Weight   01/23/21 1546 115/81 99.3  F (37.4  C) Oral 90 20 99 % 1.6 m (5' 3\") 47.2 kg (104 lb)          Physical Exam  General: Adult female sitting upright.  Eyes: PERRL, Conjunctive within normal limits.  No scleral icterus  ENT: Moist mucous membranes, oropharynx clear.   CV: Normal S1S2, no murmur, rub or gallop. Regular rate and rhythm  Resp: Clear to auscultation bilaterally, no wheezes, rales or rhonchi. Normal respiratory effort.  GI: Abdomen is soft, nontender and nondistended. No palpable masses. No rebound or guarding.  MSK: No edema. Nontender. Normal active range of motion.  Skin: Warm and dry. No rashes or lesions or ecchymoses on visible skin.  Neuro: Alert and oriented. Responds appropriately to all questions and commands. No focal findings appreciated. Normal muscle tone.  Psych: Normal mood and affect. Pleasant.    Emergency Department Course     Laboratory:    BMP: BUN 6 (L), Calcium 8.3 (L), (Creatinine 0.59) o/w WNL   Potassium: 3.3 (L)     Emergency Department Course:    Reviewed:  I reviewed the patient's nursing notes, vitals, past medical records, Care Everywhere.     Assessments:  1555 I performed an exam of the patient, as documented above.   1739 Patient rechecked and updated following laboratory results. She denies any new concerns. We spoke about discharge and she is comfortable with the plan.     Interventions:  1621 Normal Saline 1000 mL IV    Potassium Chloride 40 mEq PO    Disposition:  The patient was discharged to home.     Impression & Plan     Medical Decision Making:    Coral Green is a 30 year old female who presents for evaluation of diarrhea in the setting of a positive Covid test. I considered a broad " differential of course; the differential diagnosis of diarrhea is broad and includes such etiologies as viral gastroenteritis, traveler's diarrhea, giardia, colitis, GI bleed, bacterial infection of the large intestine (salmonella, shigella, campylobacter, e coli, etc), parasite, etc. it is likely secondary to her known underlying Covid disease, although her first diagnosis was 1 month ago.  She has not been on any antibiotics or hospitalized without red flags of suggest bacterial cause.  She has only had short duration of symptoms without indication currently for stool cultures.  There are no signs of worrisome intra-abdominal pathologies detected during the visit today and no blood per patient report.  The patient has a completely benign abdominal exam without rebound, guarding, or marked tenderness to palpation.  Her potassium was slightly low, but in the setting of ongoing diarrhea I recommended daily supplement.  She does not need the potassium supplement if she does not have any further diarrhea.  Supportive outpatient management is therefore indicated.  Abdominal pain precautions are given for home.   No indication for stool studies at this time.  No indication for CT at this time.  It was discussed to return to the ED for blood in stool, increasing pain, or fevers more than 102, or worsening of her Covid symptoms that are respiratory in nature, although those seem to be improving per her report..      Covid-19  Coral Green was evaluated during a global COVID-19 pandemic, which necessitated consideration that the patient might be at risk for infection with the SARS-CoV-2 virus that causes COVID-19.   Applicable protocols for evaluation were followed during the patient's care.   COVID-19 was considered as part of the patient's evaluation. The plan for testing is:  a test was obtained at a previous visit and reviewed & considered today.      Diagnosis:     ICD-10-CM    1. Diarrhea, unspecified type   R19.7 Potassium   2. Infection due to 2019 novel coronavirus  U07.1    3. Hypokalemia  E87.6       Discharge Medications:   New Prescriptions    BENZONATATE (TESSALON) 100 MG CAPSULE    Take 1 capsule (100 mg) by mouth 3 times daily as needed for cough    ONDANSETRON (ZOFRAN ODT) 4 MG ODT TAB    Take 1 tablet (4 mg) by mouth every 8 hours as needed for nausea or vomiting    POTASSIUM CHLORIDE ER (KLOR-CON M) 20 MEQ CR TABLET    Take 1 tablet (20 mEq) by mouth daily for 5 days Prn ongoing diarrhea        Scribe Disclosure:  IDorita, am serving as a scribe at 3:51 PM on 1/23/2021 to document services personally performed by Shanelle Wan MD based on my observations and the provider's statements to me.      Shanelle Wan MD  01/23/21 [id number]

## 2023-09-26 ENCOUNTER — HOSPITAL ENCOUNTER (EMERGENCY)
Facility: CLINIC | Age: 33
Discharge: HOME | End: 2023-09-26
Attending: EMERGENCY MEDICINE | Admitting: EMERGENCY MEDICINE
Payer: COMMERCIAL

## 2023-09-26 VITALS
HEIGHT: 62 IN | SYSTOLIC BLOOD PRESSURE: 109 MMHG | OXYGEN SATURATION: 99 % | BODY MASS INDEX: 21.71 KG/M2 | HEART RATE: 85 BPM | WEIGHT: 118 LBS | RESPIRATION RATE: 16 BRPM | TEMPERATURE: 98.3 F | DIASTOLIC BLOOD PRESSURE: 48 MMHG

## 2023-09-26 DIAGNOSIS — K21.9 GASTROESOPHAGEAL REFLUX DISEASE, UNSPECIFIED WHETHER ESOPHAGITIS PRESENT: ICD-10-CM

## 2023-09-26 DIAGNOSIS — R07.9 CHEST PAIN, UNSPECIFIED TYPE: ICD-10-CM

## 2023-09-26 LAB
ALBUMIN SERPL BCG-MCNC: 3.8 G/DL (ref 3.5–5.2)
ALP SERPL-CCNC: 65 U/L (ref 35–104)
ALT SERPL W P-5'-P-CCNC: 13 U/L (ref 0–50)
ANION GAP SERPL CALCULATED.3IONS-SCNC: 12 MMOL/L (ref 7–15)
AST SERPL W P-5'-P-CCNC: 13 U/L (ref 0–45)
ATRIAL RATE - MUSE: 92 BPM
BASOPHILS # BLD AUTO: 0 10E3/UL (ref 0–0.2)
BASOPHILS NFR BLD AUTO: 0 %
BILIRUB SERPL-MCNC: <0.2 MG/DL
BUN SERPL-MCNC: 6 MG/DL (ref 6–20)
CALCIUM SERPL-MCNC: 8.7 MG/DL (ref 8.6–10)
CHLORIDE SERPL-SCNC: 104 MMOL/L (ref 98–107)
CREAT SERPL-MCNC: 0.52 MG/DL (ref 0.51–0.95)
DEPRECATED HCO3 PLAS-SCNC: 20 MMOL/L (ref 22–29)
DIASTOLIC BLOOD PRESSURE - MUSE: NORMAL MMHG
EGFRCR SERPLBLD CKD-EPI 2021: >90 ML/MIN/1.73M2
EOSINOPHIL # BLD AUTO: 0.1 10E3/UL (ref 0–0.7)
EOSINOPHIL NFR BLD AUTO: 1 %
ERYTHROCYTE [DISTWIDTH] IN BLOOD BY AUTOMATED COUNT: 12.7 % (ref 10–15)
GLUCOSE SERPL-MCNC: 147 MG/DL (ref 70–99)
HCT VFR BLD AUTO: 35.2 % (ref 35–47)
HGB BLD-MCNC: 11.8 G/DL (ref 11.7–15.7)
IMM GRANULOCYTES # BLD: 0 10E3/UL
IMM GRANULOCYTES NFR BLD: 0 %
INTERPRETATION ECG - MUSE: NORMAL
LYMPHOCYTES # BLD AUTO: 3 10E3/UL (ref 0.8–5.3)
LYMPHOCYTES NFR BLD AUTO: 31 %
MCH RBC QN AUTO: 31.2 PG (ref 26.5–33)
MCHC RBC AUTO-ENTMCNC: 33.5 G/DL (ref 31.5–36.5)
MCV RBC AUTO: 93 FL (ref 78–100)
MONOCYTES # BLD AUTO: 0.4 10E3/UL (ref 0–1.3)
MONOCYTES NFR BLD AUTO: 4 %
NEUTROPHILS # BLD AUTO: 6.1 10E3/UL (ref 1.6–8.3)
NEUTROPHILS NFR BLD AUTO: 64 %
NRBC # BLD AUTO: 0 10E3/UL
NRBC BLD AUTO-RTO: 0 /100
P AXIS - MUSE: 58 DEGREES
PLATELET # BLD AUTO: 305 10E3/UL (ref 150–450)
POTASSIUM SERPL-SCNC: 4.2 MMOL/L (ref 3.4–5.3)
PR INTERVAL - MUSE: 134 MS
PROT SERPL-MCNC: 6.7 G/DL (ref 6.4–8.3)
QRS DURATION - MUSE: 84 MS
QT - MUSE: 348 MS
QTC - MUSE: 430 MS
R AXIS - MUSE: 76 DEGREES
RBC # BLD AUTO: 3.78 10E6/UL (ref 3.8–5.2)
SODIUM SERPL-SCNC: 136 MMOL/L (ref 135–145)
SYSTOLIC BLOOD PRESSURE - MUSE: NORMAL MMHG
T AXIS - MUSE: 46 DEGREES
TROPONIN T SERPL HS-MCNC: <6 NG/L
VENTRICULAR RATE- MUSE: 92 BPM
WBC # BLD AUTO: 9.7 10E3/UL (ref 4–11)

## 2023-09-26 PROCEDURE — 36415 COLL VENOUS BLD VENIPUNCTURE: CPT | Performed by: EMERGENCY MEDICINE

## 2023-09-26 PROCEDURE — 84484 ASSAY OF TROPONIN QUANT: CPT | Performed by: EMERGENCY MEDICINE

## 2023-09-26 PROCEDURE — 80053 COMPREHEN METABOLIC PANEL: CPT | Performed by: EMERGENCY MEDICINE

## 2023-09-26 PROCEDURE — 93005 ELECTROCARDIOGRAM TRACING: CPT

## 2023-09-26 PROCEDURE — 85025 COMPLETE CBC W/AUTO DIFF WBC: CPT | Performed by: EMERGENCY MEDICINE

## 2023-09-26 PROCEDURE — 99284 EMERGENCY DEPT VISIT MOD MDM: CPT

## 2023-09-26 ASSESSMENT — ACTIVITIES OF DAILY LIVING (ADL)
ADLS_ACUITY_SCORE: 35

## 2023-09-26 NOTE — ED PROVIDER NOTES
"    History     Chief Complaint:  Chest Pain       The history is provided by the patient.      Coral Green is a 33 year old female, A0, who is 14 weeks pregnant who presents to the ED with episodes of chest pain and left sided shoulder pain. The patient reports that last night she had pain in her left shoulder, which she initially attributed to GERD or heartburn. Later, she reports that at around 0500 she work up with intense chest pain that took 10-15 minutes to resolve. She states that although the chest pain is gone now, she had the same shoulder pain again this morning, prompting her to visit her office nurse practitioner who recommended she come in to the ED. Coral notes she does have a history of GERD, and took various medications for it but is not taking anything right now. Patient denies any shortness of breath, pain during deep breaths, abdominal pain, recent sickness, or neck pain or stiffness. She also denies any prior surgeries, complications with the pregnancy, history of hypertension, hyperlipidemia, diabetes, stroke, or family history of heart problems. Outside of this, the patient states she ate Chinese food last night at around 1900, and went to bed at 2300. 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 Patient notes she has some shortness of breath due to her pregnancy.     Independent Historian:    None - patient only    Review of External Notes:  na      Medications:    Tessalon  Wartpeel  Levothyroxine    Past Medical History:    Acquired hypothyroidism    Physical Exam   Patient Vitals for the past 24 hrs:   BP Temp Temp src Pulse Resp SpO2 Height Weight   23 1428 109/48 98.3  F (36.8  C) Oral 85 16 99 % 1.575 m (5' 2\") 53.5 kg (118 lb)        Physical Exam  GENERAL: well developed, pleasant  HEAD: atraumatic  EYES: pupils reactive, extraocular muscles intact, conjunctivae normal  ENT:  mucus " membranes moist  NECK:  trachea midline, normal range of motion  RESPIRATORY: no tachypnea, breath sounds clear to auscultation   CVS: normal S1/S2, no murmurs, intact distal pulses  ABDOMEN: soft, nontender, nondistention  MUSCULOSKELETAL: no deformities  SKIN: warm and dry, no acute rashes or ulceration  NEURO: GCS 15, cranial nerves intact, alert and oriented x3  PSYCH:  Mood/affect normal      Emergency Department Course   ECG  ECG taken at 1355, ECG read at 1756  Normal sinus rhythm  Normal ECG   When compared with prior ECG, dated 19/1/21, no significant changes.  Rate 92 bpm. IL interval 134 ms. QRS duration 84 ms. QT/QTc 3448/430 ms. P-R-T axes 58 76 46.    Laboratory:  Labs Ordered and Resulted from Time of ED Arrival to Time of ED Departure   COMPREHENSIVE METABOLIC PANEL - Abnormal       Result Value    Sodium 136      Potassium 4.2      Carbon Dioxide (CO2) 20 (*)     Anion Gap 12      Urea Nitrogen 6.0      Creatinine 0.52      GFR Estimate >90      Calcium 8.7      Chloride 104      Glucose 147 (*)     Alkaline Phosphatase 65      AST 13      ALT 13      Protein Total 6.7      Albumin 3.8      Bilirubin Total <0.2     CBC WITH PLATELETS AND DIFFERENTIAL - Abnormal    WBC Count 9.7      RBC Count 3.78 (*)     Hemoglobin 11.8      Hematocrit 35.2      MCV 93      MCH 31.2      MCHC 33.5      RDW 12.7      Platelet Count 305      % Neutrophils 64      % Lymphocytes 31      % Monocytes 4      % Eosinophils 1      % Basophils 0      % Immature Granulocytes 0      NRBCs per 100 WBC 0      Absolute Neutrophils 6.1      Absolute Lymphocytes 3.0      Absolute Monocytes 0.4      Absolute Eosinophils 0.1      Absolute Basophils 0.0      Absolute Immature Granulocytes 0.0      Absolute NRBCs 0.0     TROPONIN T, HIGH SENSITIVITY - Normal    Troponin T, High Sensitivity <6         Emergency Department Course & Assessments:    Interventions:  Medications - No data to display     Assessments:  1754 I obtained history  and examined the patient as noted above.    Independent Interpretation (X-rays, CTs, rhythm strip):  None    Consultations/Discussion of Management or Tests:  None       Social Determinants of Health affecting care:  na     Disposition:  The patient was discharged to home.     Impression & Plan    CMS Diagnoses: None    Medical Decision Making:  Patient presents with chest pain and shoulder pain.  She is currently 14 weeks pregnant.  She has struggled with GERD and acid reflux and took some Tums last night.  She has no upper abdominal pain.  She has no pulmonary symptoms in terms of PE risks.  Cardiac work-up is negative.  Discussed starting Pepcid and following up with her OB.  Vital signs here are otherwise normal.    Diagnosis:    ICD-10-CM    1. Chest pain, unspecified type  R07.9       2. Gastroesophageal reflux disease, unspecified whether esophagitis present  K21.9           Scribe Disclosure:  Ponce LUGO, am serving as a scribe at 6:12 PM on 9/26/2023 to document services personally performed by Zain La MD, based on my observations and the provider's statements to me.  9/26/2023   Zain La MD Adams, Shaun L, MD  09/27/23 0029

## 2023-09-26 NOTE — ED TRIAGE NOTES
Last night at 10PM shoulder pain and then at 5am she had severe chest pain that lasted 15 minutes.  Was seen by NP today and she suggested to come in and get checked, FHT check at clinic 155BPM. EKG NSR, VSS, IV for labs with trop.  14 weeks preg.   Triage Assessment       Row Name 09/26/23 1271       Triage Assessment (Adult)    Airway WDL WDL       Respiratory WDL    Respiratory WDL WDL       Cardiac WDL    Cardiac WDL WDL       Cognitive/Neuro/Behavioral WDL    Cognitive/Neuro/Behavioral WDL WDL

## 2024-05-08 PROCEDURE — 88305 TISSUE EXAM BY PATHOLOGIST: CPT | Mod: 26 | Performed by: PATHOLOGY

## 2024-05-08 PROCEDURE — 88305 TISSUE EXAM BY PATHOLOGIST: CPT | Mod: TC,ORL | Performed by: OBSTETRICS & GYNECOLOGY

## 2024-05-09 ENCOUNTER — LAB REQUISITION (OUTPATIENT)
Dept: LAB | Facility: CLINIC | Age: 34
End: 2024-05-09
Payer: COMMERCIAL

## 2024-05-10 LAB
PATH REPORT.COMMENTS IMP SPEC: NORMAL
PATH REPORT.COMMENTS IMP SPEC: NORMAL
PATH REPORT.FINAL DX SPEC: NORMAL
PATH REPORT.GROSS SPEC: NORMAL
PATH REPORT.MICROSCOPIC SPEC OTHER STN: NORMAL
PATH REPORT.RELEVANT HX SPEC: NORMAL
PHOTO IMAGE: NORMAL